# Patient Record
Sex: FEMALE | Race: WHITE | NOT HISPANIC OR LATINO | Employment: OTHER | URBAN - METROPOLITAN AREA
[De-identification: names, ages, dates, MRNs, and addresses within clinical notes are randomized per-mention and may not be internally consistent; named-entity substitution may affect disease eponyms.]

---

## 2022-10-19 NOTE — PROGRESS NOTES
Chief Complaint  IBS    Leann Zhu is a 67 y.o. female who presents to [facility]- [facility] on referral from Camille Falcon DO for a gastroenterology evaluation of IBS      History of Present Illness  New patient presents to the office for IBS. Patient reports alternating bowel habits between constipation and looser stool, this has been going on for many years but feels that it is progressively getting worse. She has a lot of lower abdominal cramping. She has had episodes of fecal incontiennce. Patient reports occasion black stool. She has been having worsening heartburn that she takes TUMS. She has occasional nausea with heartburn. Denies vomiting, dysphagia, and unintentional weight loss.     Colonoscopy 05/30/2019 by Dr. Corrigan - diverticulosis, right colon submucosal lipoma    Past Medical History:   Diagnosis Date   • Anemia     Diagnosed by doctor in June; briefly took iron supplement; bloodwork now fine   • Colon polyp     Have had polyps removed in the past   • Diverticulitis of colon     Indicated by last several colonoscopies   • GERD (gastroesophageal reflux disease)     Have suffered from severe heartburn on and off for years   • Hypertension     Take a low doseage (10mg) of Olmesartan daily   • Irritable bowel syndrome     Self diagnosis       Past Surgical History:   Procedure Laterality Date   • COLONOSCOPY     • FLEXIBLE SIGMOIDOSCOPY     • HYSTERECTOMY  August 2001         Current Outpatient Medications:   •  estradiol (ESTRACE) 2 MG tablet, estradiol 2 mg tablet, Disp: , Rfl:   •  estrogens, conjugated, (PREMARIN) 0.9 MG tablet, Take 1 tablet by mouth Daily., Disp: , Rfl:   •  Nutritional Supplements (IBS SUPPORT PO), Take  by mouth., Disp: , Rfl:   •  olmesartan (BENICAR) 20 MG tablet, , Disp: , Rfl:   •  oxybutynin (DITROPAN) 5 MG tablet, Take 1 tablet by mouth 3 (Three) Times a Day., Disp: , Rfl:   •  sertraline (ZOLOFT) 50 MG tablet, Take 1 tablet by  "mouth Daily., Disp: , Rfl:   •  dicyclomine (BENTYL) 20 MG tablet, , Disp: , Rfl:   •  FLUoxetine (PROzac) 20 MG capsule, fluoxetine 20 mg capsule, Disp: , Rfl:   •  omeprazole (priLOSEC) 20 MG capsule, Take 1 capsule by mouth Daily for 90 days., Disp: 90 capsule, Rfl: 1  •  ondansetron (ZOFRAN) 4 MG tablet, ondansetron HCl 4 mg tablet, Disp: , Rfl:      No Known Allergies    Family History   Problem Relation Age of Onset   • Colon cancer Mother         Surgery in her early 80s; lived to almost 92   • Stomach cancer Mother          from it at age 91   • Alcohol abuse Father    • Colon cancer Paternal Grandmother 50        Social History     Social History Narrative   • Not on file       Immunization:  Immunization History   Administered Date(s) Administered   • COVID-19 (PFIZER) PURPLE CAP 2021, 2021, 2021        Objective     Vital Signs:   /65 (BP Location: Right arm, Patient Position: Sitting, Cuff Size: Adult)   Pulse 63   Ht 160 cm (63\")   Wt 79.2 kg (174 lb 8 oz)   SpO2 98%   BMI 30.91 kg/m²       Physical Exam  Constitutional:       Appearance: Normal appearance.   HENT:      Head: Normocephalic.   Cardiovascular:      Rate and Rhythm: Normal rate and regular rhythm.      Heart sounds: Normal heart sounds.   Pulmonary:      Effort: Pulmonary effort is normal.      Breath sounds: Normal breath sounds.   Abdominal:      General: Bowel sounds are normal.      Palpations: Abdomen is soft.   Skin:     General: Skin is warm and dry.   Neurological:      Mental Status: She is alert and oriented to person, place, and time. Mental status is at baseline.   Psychiatric:         Mood and Affect: Mood normal.         Behavior: Behavior normal.         Thought Content: Thought content normal.         Judgment: Judgment normal.         Result Review :                 Assessment and Plan    Diagnoses and all orders for this visit:    1. Altered bowel habits (Primary)    2. Constipation, " unspecified constipation type    3. Straining during bowel movements    4. Incontinence of feces, unspecified fecal incontinence type    5. Black stool    6. Family history of colon cancer    7. Heartburn    8. Nausea    Other orders  -     omeprazole (priLOSEC) 20 MG capsule; Take 1 capsule by mouth Daily for 90 days.  Dispense: 90 capsule; Refill: 1    67-year-old patient presents to the office for altered bowel habits, constipation, straining, fecal incontinence, black stool, nausea, and heartburn. I have advised patient to add a daily fiber supplement to help provide a more complete bowel movement.  If constipation persists she will utilize MiraLAX and titrate as needed to manage constipation.  Patient will continue Bentyl for abdominal cramping.  I have prescribed omeprazole 20 mg daily to control heartburn and nausea.  I have advised patient proceed with EGD and colonoscopy, we will schedule at her earliest convenience.  Patient is agreeable to plan will call the office for any questions or concerns.    COLONOSCOPY Surgical Risk and Benefits: Possible risk/complications, benefits, and alternatives to surgical or invasive procedure have been explained to patient and/or legal guardian. Risks include bleeding, infection, and perforation. Patient has been evaluated and can tolerate anesthesia and/or sedation. Risk, benefits, and alternatives to anesthesia and sedation have been explained to patient and/or legal guardian.     Follow Up   No follow-ups on file.  Patient was given instructions and counseling regarding her condition or for health maintenance advice. Please see specific information pulled into the AVS if appropriate.

## 2022-10-20 ENCOUNTER — PREP FOR SURGERY (OUTPATIENT)
Dept: OTHER | Facility: HOSPITAL | Age: 67
End: 2022-10-20

## 2022-10-20 ENCOUNTER — OFFICE VISIT (OUTPATIENT)
Dept: GASTROENTEROLOGY | Facility: CLINIC | Age: 67
End: 2022-10-20

## 2022-10-20 VITALS
BODY MASS INDEX: 30.92 KG/M2 | HEIGHT: 63 IN | DIASTOLIC BLOOD PRESSURE: 65 MMHG | WEIGHT: 174.5 LBS | OXYGEN SATURATION: 98 % | SYSTOLIC BLOOD PRESSURE: 135 MMHG | HEART RATE: 63 BPM

## 2022-10-20 DIAGNOSIS — K92.1 BLACK STOOL: ICD-10-CM

## 2022-10-20 DIAGNOSIS — R11.0 NAUSEA: ICD-10-CM

## 2022-10-20 DIAGNOSIS — R12 HEARTBURN: ICD-10-CM

## 2022-10-20 DIAGNOSIS — R19.4 ALTERED BOWEL HABITS: Primary | ICD-10-CM

## 2022-10-20 DIAGNOSIS — K59.00 CONSTIPATION, UNSPECIFIED CONSTIPATION TYPE: ICD-10-CM

## 2022-10-20 DIAGNOSIS — R15.9 INCONTINENCE OF FECES, UNSPECIFIED FECAL INCONTINENCE TYPE: ICD-10-CM

## 2022-10-20 DIAGNOSIS — R19.8 STRAINING DURING BOWEL MOVEMENTS: ICD-10-CM

## 2022-10-20 DIAGNOSIS — Z80.0 FAMILY HISTORY OF COLON CANCER: ICD-10-CM

## 2022-10-20 PROCEDURE — 99204 OFFICE O/P NEW MOD 45 MIN: CPT

## 2022-10-20 RX ORDER — OMEPRAZOLE 20 MG/1
20 CAPSULE, DELAYED RELEASE ORAL DAILY
Qty: 90 CAPSULE | Refills: 1 | Status: SHIPPED | OUTPATIENT
Start: 2022-10-20 | End: 2023-01-18

## 2022-10-20 RX ORDER — ESTRADIOL 2 MG/1
2 TABLET ORAL DAILY
COMMUNITY

## 2022-10-20 RX ORDER — OLMESARTAN MEDOXOMIL 20 MG/1
10 TABLET ORAL DAILY
COMMUNITY
Start: 2022-08-31

## 2022-10-20 RX ORDER — SODIUM PICOSULFATE, MAGNESIUM OXIDE, AND ANHYDROUS CITRIC ACID 10; 3.5; 12 MG/160ML; G/160ML; G/160ML
160 LIQUID ORAL TAKE AS DIRECTED
Qty: 320 ML | Refills: 0 | Status: ON HOLD | COMMUNITY
Start: 2022-10-20 | End: 2023-01-12

## 2022-10-20 RX ORDER — FLUOXETINE HYDROCHLORIDE 20 MG/1
CAPSULE ORAL
COMMUNITY
End: 2023-01-06

## 2022-10-20 RX ORDER — ONDANSETRON 4 MG/1
4 TABLET, FILM COATED ORAL EVERY 8 HOURS PRN
COMMUNITY

## 2022-10-20 RX ORDER — OXYBUTYNIN CHLORIDE 5 MG/1
5 TABLET ORAL 2 TIMES DAILY
COMMUNITY

## 2022-10-20 RX ORDER — DICYCLOMINE HCL 20 MG
20 TABLET ORAL AS NEEDED
COMMUNITY
Start: 2022-09-13

## 2022-10-24 ENCOUNTER — PATIENT ROUNDING (BHMG ONLY) (OUTPATIENT)
Dept: GASTROENTEROLOGY | Facility: CLINIC | Age: 67
End: 2022-10-24

## 2022-10-24 NOTE — PROGRESS NOTES
10/24/2022      Hello, may I speak with Leann Zhu     My name is Catracho. I am calling from [facility] [place].    Before we get started may I verify your date of birth? 1955    I am calling to officially welcome you to our practice and ask about your recent visit. Is this a good time to talk? No. I left patient a voicemail.     Tell me about your visit with us. What things went well?         We're always looking for ways to make our patients' experiences even better. Do you have recommendations on ways we may improve?    Overall were you satisfied with your first visit to our practice?    I appreciate you taking the time to speak with me today. Is there anything else I can do for you?    I'm glad to hear that you had a very good visit. We would really appreciate you completing a survey if you receive one either in the mail, email or text.       Thank you, and have a great day.

## 2022-12-29 ENCOUNTER — TELEPHONE (OUTPATIENT)
Dept: GASTROENTEROLOGY | Facility: CLINIC | Age: 67
End: 2022-12-29

## 2022-12-29 NOTE — TELEPHONE ENCOUNTER
I spoke with Ms Zhu, confirmed her scheduled egd/colonoscopy on 01.12.2023, arrival time of 9:30am. Reminded of liquid diet the day prior. Reminded of bowel prep and instructions.  She stated she has the prep, but not the instructions.  [facility] Voiced understanding. [email or web address]

## 2023-01-06 NOTE — PRE-PROCEDURE INSTRUCTIONS
PT INSTRUCTED ON CLEAR LIQ DIET AND PO SPLIT PREP LAST BM SHOULD BE CLEAR  No meds to take the am of procedure  ARRIVAL TIME IS 0930 am ON 1/12/23

## 2023-01-12 ENCOUNTER — ANESTHESIA EVENT (OUTPATIENT)
Dept: GASTROENTEROLOGY | Facility: HOSPITAL | Age: 68
End: 2023-01-12
Payer: MEDICARE

## 2023-01-12 ENCOUNTER — ANESTHESIA (OUTPATIENT)
Dept: GASTROENTEROLOGY | Facility: HOSPITAL | Age: 68
End: 2023-01-12
Payer: MEDICARE

## 2023-01-12 ENCOUNTER — HOSPITAL ENCOUNTER (OUTPATIENT)
Facility: HOSPITAL | Age: 68
Setting detail: HOSPITAL OUTPATIENT SURGERY
Discharge: HOME | End: 2023-01-12
Attending: INTERNAL MEDICINE | Admitting: INTERNAL MEDICINE
Payer: MEDICARE

## 2023-01-12 VITALS
BODY MASS INDEX: 29.92 KG/M2 | HEART RATE: 65 BPM | HEIGHT: 63 IN | TEMPERATURE: 97 F | RESPIRATION RATE: 17 BRPM | WEIGHT: 168.87 LBS | OXYGEN SATURATION: 96 % | DIASTOLIC BLOOD PRESSURE: 83 MMHG | SYSTOLIC BLOOD PRESSURE: 96 MMHG

## 2023-01-12 DIAGNOSIS — K92.1 BLACK STOOL: ICD-10-CM

## 2023-01-12 DIAGNOSIS — R19.8 STRAINING DURING BOWEL MOVEMENTS: ICD-10-CM

## 2023-01-12 DIAGNOSIS — R15.9 INCONTINENCE OF FECES, UNSPECIFIED FECAL INCONTINENCE TYPE: ICD-10-CM

## 2023-01-12 DIAGNOSIS — R19.4 ALTERED BOWEL HABITS: ICD-10-CM

## 2023-01-12 DIAGNOSIS — R11.0 NAUSEA: ICD-10-CM

## 2023-01-12 DIAGNOSIS — R12 HEARTBURN: ICD-10-CM

## 2023-01-12 DIAGNOSIS — K59.00 CONSTIPATION, UNSPECIFIED CONSTIPATION TYPE: ICD-10-CM

## 2023-01-12 PROCEDURE — 43239 EGD BIOPSY SINGLE/MULTIPLE: CPT | Performed by: INTERNAL MEDICINE

## 2023-01-12 PROCEDURE — 88305 TISSUE EXAM BY PATHOLOGIST: CPT | Performed by: INTERNAL MEDICINE

## 2023-01-12 PROCEDURE — 45385 COLONOSCOPY W/LESION REMOVAL: CPT | Performed by: INTERNAL MEDICINE

## 2023-01-12 PROCEDURE — 25010000002 PROPOFOL 10 MG/ML EMULSION: Performed by: MARRIAGE & FAMILY THERAPIST

## 2023-01-12 RX ORDER — SODIUM CHLORIDE, SODIUM LACTATE, POTASSIUM CHLORIDE, CALCIUM CHLORIDE 600; 310; 30; 20 MG/100ML; MG/100ML; MG/100ML; MG/100ML
30 INJECTION, SOLUTION INTRAVENOUS CONTINUOUS
Status: DISCONTINUED | OUTPATIENT
Start: 2023-01-12 | End: 2023-01-12 | Stop reason: HOSPADM

## 2023-01-12 RX ORDER — LIDOCAINE HYDROCHLORIDE 20 MG/ML
INJECTION, SOLUTION EPIDURAL; INFILTRATION; INTRACAUDAL; PERINEURAL AS NEEDED
Status: DISCONTINUED | OUTPATIENT
Start: 2023-01-12 | End: 2023-01-12 | Stop reason: SURG

## 2023-01-12 RX ORDER — PROPOFOL 10 MG/ML
VIAL (ML) INTRAVENOUS AS NEEDED
Status: DISCONTINUED | OUTPATIENT
Start: 2023-01-12 | End: 2023-01-12 | Stop reason: SURG

## 2023-01-12 RX ORDER — SODIUM CHLORIDE, SODIUM LACTATE, POTASSIUM CHLORIDE, CALCIUM CHLORIDE 600; 310; 30; 20 MG/100ML; MG/100ML; MG/100ML; MG/100ML
INJECTION, SOLUTION INTRAVENOUS CONTINUOUS PRN
Status: DISCONTINUED | OUTPATIENT
Start: 2023-01-12 | End: 2023-01-12 | Stop reason: SURG

## 2023-01-12 RX ADMIN — SODIUM CHLORIDE, POTASSIUM CHLORIDE, SODIUM LACTATE AND CALCIUM CHLORIDE: 600; 310; 30; 20 INJECTION, SOLUTION INTRAVENOUS at 13:03

## 2023-01-12 RX ADMIN — LIDOCAINE HYDROCHLORIDE 50 MG: 20 INJECTION, SOLUTION EPIDURAL; INFILTRATION; INTRACAUDAL; PERINEURAL at 13:03

## 2023-01-12 RX ADMIN — PROPOFOL 145 MCG/KG/MIN: 10 INJECTION, EMULSION INTRAVENOUS at 13:03

## 2023-01-12 RX ADMIN — PROPOFOL 50 MG: 10 INJECTION, EMULSION INTRAVENOUS at 13:03

## 2023-01-12 RX ADMIN — SODIUM CHLORIDE, POTASSIUM CHLORIDE, SODIUM LACTATE AND CALCIUM CHLORIDE 30 ML/HR: 600; 310; 30; 20 INJECTION, SOLUTION INTRAVENOUS at 11:28

## 2023-01-12 NOTE — ANESTHESIA PREPROCEDURE EVALUATION
Anesthesia Evaluation     Patient summary reviewed and Nursing notes reviewed   no history of anesthetic complications:  NPO Solid Status: > 8 hours  NPO Liquid Status: > 2 hours           Airway   Mallampati: II  TM distance: >3 FB  Neck ROM: full  No difficulty expected  Dental      Pulmonary - negative pulmonary ROS and normal exam    breath sounds clear to auscultation  Cardiovascular - normal exam  Exercise tolerance: good (4-7 METS)    Rhythm: regular  Rate: normal    (+) hypertension,       Neuro/Psych- negative ROS  GI/Hepatic/Renal/Endo    (+)  GERD,      Musculoskeletal (-) negative ROS    Abdominal    Substance History - negative use     OB/GYN negative ob/gyn ROS         Other - negative ROS       ROS/Med Hx Other: PAT Nursing Notes unavailable.                   Anesthesia Plan    ASA 2     general     (Total IV Anesthesia    Patient understands anesthesia not responsible for dental damage.  )  intravenous induction     Anesthetic plan, risks, benefits, and alternatives have been provided, discussed and informed consent has been obtained with: patient.    Plan discussed with CRNA.        CODE STATUS:

## 2023-01-12 NOTE — ANESTHESIA POSTPROCEDURE EVALUATION
Patient: Leann Zhu    Procedure Summary     Date: 01/12/23 Room / Location: [facility] ENDOSCOPY 5 / [facility] END[facility]COPY    Anesthesia Start: 1300 Anesthesia Stop: 1331    Procedures:       ESOPHAGOGASTRODUODENOSCOPY WITH BIOPSIES      COLONOSCOPY WITH POLYPECTOMY Diagnosis:       Altered bowel habits      Constipation, unspecified constipation type      Straining during bowel movements      Incontinence of feces, unspecified fecal incontinence type      Black stool      Heartburn      Nausea      (Altered bowel habits [R19.4])      (Constipation, unspecified constipation type [K59.00])      (Straining during bowel movements [R19.8])      (Incontinence of feces, unspecified fecal incontinence type [R15.9])      (Black stool [K92.1])      (Heartburn [R12])      (Nausea [R11.0])    Surgeons: Abebe Duong MD Provider: Rustam Gomez MD    Anesthesia Type: general ASA Status: 2          Anesthesia Type: general    Vitals  Vitals Value Taken Time   BP 96/83 01/12/23 1350   Temp 36.1 °C (97 °F) 01/12/23 1345   Pulse 60 01/12/23 1353   Resp 17 01/12/23 1335   SpO2 97 % 01/12/23 1353   Vitals shown include unvalidated device data.        Post Anesthesia Care and Evaluation    Patient location during evaluation: bedside  Patient participation: complete - patient participated  Level of consciousness: awake  Pain management: adequate    Airway patency: patent  Anesthetic complications: No anesthetic complications  PONV Status: none  Cardiovascular status: acceptable and stable  Respiratory status: acceptable  Hydration status: acceptable    Comments: An Anesthesiologist personally participated in the most demanding procedures (including induction and emergence if applicable) in the anesthesia plan, monitored the course of anesthesia administration at frequent intervals and remained physically present and available for immediate diagnosis and treatment of emergencies.

## 2023-01-12 NOTE — H&P
Pre Procedure History & Physical    Chief Complaint:   Change in bowel habits  Fecal incontinence  Nausea  gerd  gerd    Subjective     HPI:   As above    Past Medical History:   Past Medical History:   Diagnosis Date   • Anemia     Diagnosed by doctor in ; briefly took iron supplement; bloodwork now fine   • Colon polyp     Have had polyps removed in the past   • Diverticulitis of colon     Indicated by last several colonoscopies   • GERD (gastroesophageal reflux disease)     Have suffered from severe heartburn on and off for years   • Hypertension     Take a low doseage (10mg) of Olmesartan daily   • Irritable bowel syndrome     Self diagnosis       Past Surgical History:  Past Surgical History:   Procedure Laterality Date   • COLONOSCOPY     • FLEXIBLE SIGMOIDOSCOPY     • HYSTERECTOMY  2001   • TOE SURGERY Right     little toe       Family History:  Family History   Problem Relation Age of Onset   • Colon cancer Mother         Surgery in her early 80s; lived to almost 92   • Stomach cancer Mother          from it at age 91   • Alcohol abuse Father    • Colon cancer Paternal Grandmother 50   • Malig Hyperthermia Neg Hx        Social History:   reports that she has been smoking cigarettes. She has never used smokeless tobacco. She reports current alcohol use. She reports that she does not use drugs.    Medications:   Medications Prior to Admission   Medication Sig Dispense Refill Last Dose   • dicyclomine (BENTYL) 20 MG tablet Take 20 mg by mouth As Needed.   Past Month   • estradiol (ESTRACE) 2 MG tablet Take 2 mg by mouth Daily.   2023   • estrogens, conjugated, (PREMARIN) 0.9 MG tablet Take 1 tablet by mouth Daily.   2023   • Nutritional Supplements (IBS SUPPORT PO) Take 1 tablet by mouth Daily.   2023   • olmesartan (BENICAR) 20 MG tablet Take 10 mg by mouth Daily.   2023   • omeprazole (priLOSEC) 20 MG capsule Take 1 capsule by mouth Daily for 90 days. 90 capsule 1 2023  "  • ondansetron (ZOFRAN) 4 MG tablet Take 4 mg by mouth Every 8 (Eight) Hours As Needed.   Past Month   • oxybutynin (DITROPAN) 5 MG tablet Take 5 mg by mouth 2 (Two) Times a Day.   1/11/2023   • sertraline (ZOLOFT) 50 MG tablet Take 1 tablet by mouth Daily.   1/11/2023       Allergies:  Patient has no known allergies.        Objective     Blood pressure 162/73, pulse 54, temperature 97.5 °F (36.4 °C), temperature source Temporal, resp. rate 20, height 160 cm (62.99\"), weight 76.6 kg (168 lb 14 oz), SpO2 93 %.    Physical Exam   Constitutional: Pt is oriented to person, place, and time and well-developed, well-nourished, and in no distress.   Mouth/Throat: Oropharynx is clear and moist.   Neck: Normal range of motion.   Cardiovascular: Normal rate, regular rhythm and normal heart sounds.    Pulmonary/Chest: Effort normal and breath sounds normal.   Abdominal: Soft. Nontender  Skin: Skin is warm and dry.   Psychiatric: Mood, memory, affect and judgment normal.     Assessment & Plan     Diagnosis:  Change in bowel habits  Fecal incontinence  Nausea  gerd      Anticipated Surgical Procedure:  egd  colonoscopy    The risks, benefits, and alternatives of this procedure have been discussed with the patient or the responsible party- the patient understands and agrees to proceed.            "

## 2023-01-16 LAB
CYTO UR: NORMAL
LAB AP CASE REPORT: NORMAL
LAB AP CLINICAL INFORMATION: NORMAL
PATH REPORT.FINAL DX SPEC: NORMAL
PATH REPORT.GROSS SPEC: NORMAL

## (undated) DEVICE — SOLIDIFIER LIQLOC PLS 1500CC BT

## (undated) DEVICE — BLCK/BITE BLOX WO/DENTL/RIM W/STRAP 54F

## (undated) DEVICE — Device: Brand: DEFENDO AIR/WATER/SUCTION AND BIOPSY VALVE

## (undated) DEVICE — Device

## (undated) DEVICE — SNAR POLYP CAPTIFLEX XS/OVL 11X2.4MM 240CM 1P/U

## (undated) DEVICE — THE SINGLE USE ETRAP – POLYP TRAP IS USED FOR SUCTION RETRIEVAL OF ENDOSCOPICALLY REMOVED POLYPS.: Brand: ETRAP

## (undated) DEVICE — SINGLE-USE BIOPSY FORCEPS: Brand: RADIAL JAW 4

## (undated) DEVICE — CONN JET HYDRA H20 AUXILIARY DISP

## (undated) DEVICE — LINER SURG CANSTR SXN S/RIGD 1500CC

## (undated) DEVICE — SOL IRRG H2O PL/BG 1000ML STRL